# Patient Record
Sex: FEMALE | Race: WHITE | NOT HISPANIC OR LATINO | URBAN - METROPOLITAN AREA
[De-identification: names, ages, dates, MRNs, and addresses within clinical notes are randomized per-mention and may not be internally consistent; named-entity substitution may affect disease eponyms.]

---

## 2023-02-23 ENCOUNTER — OFFICE (OUTPATIENT)
Dept: URBAN - METROPOLITAN AREA CLINIC 105 | Facility: CLINIC | Age: 43
End: 2023-02-23

## 2023-02-23 VITALS
DIASTOLIC BLOOD PRESSURE: 70 MMHG | OXYGEN SATURATION: 97 % | HEART RATE: 100 BPM | HEIGHT: 64 IN | SYSTOLIC BLOOD PRESSURE: 109 MMHG | WEIGHT: 199 LBS

## 2023-02-23 DIAGNOSIS — Z83.71 FAMILY HISTORY OF COLONIC POLYPS: ICD-10-CM

## 2023-02-23 DIAGNOSIS — R14.0 ABDOMINAL DISTENSION (GASEOUS): ICD-10-CM

## 2023-02-23 PROCEDURE — 99202 OFFICE O/P NEW SF 15 MIN: CPT

## 2023-02-23 NOTE — SERVICEHPINOTES
Edith Contreras   is seen for an initial visit today.  
jack Cagle is a 42-year-old female who presents today for an initial visit.Madie has family history of colon polyps and IBS in mother at age 53 and needs to be evaluated for colonoscopy. Patient was previously taking probiotic.Madie has history of psoriasis. Patient admits she was seen and evaluated 2 dermatologists in the past, who prescribed two different medications, one of which caused her to have depression and other caused her to have diarrhea.brPatient c/o experiencing mild bloating after eating meal.brShe drinks alcohol occasionally. Patient is former smoker and does not use drugs.brDenies blood in stool. Denies chest pain. Denies heartburn. Denies dysphagia. Denies black stool. Denies family history of colon cancer. Denies family history of, Crohn’s, or ulcerative colitis. Denies celiac or gluten allergy.jack Cagle presents for evaluation for colonoscopy for family history of colon polyps. She was previously taking probiotic. Her mother had a colon polyp at age 53.